# Patient Record
Sex: MALE | Race: WHITE
[De-identification: names, ages, dates, MRNs, and addresses within clinical notes are randomized per-mention and may not be internally consistent; named-entity substitution may affect disease eponyms.]

---

## 2019-12-01 ENCOUNTER — HOSPITAL ENCOUNTER (EMERGENCY)
Dept: HOSPITAL 62 - ER | Age: 54
Discharge: HOME | End: 2019-12-01
Payer: OTHER GOVERNMENT

## 2019-12-01 VITALS — SYSTOLIC BLOOD PRESSURE: 132 MMHG | DIASTOLIC BLOOD PRESSURE: 83 MMHG

## 2019-12-01 DIAGNOSIS — R53.1: ICD-10-CM

## 2019-12-01 DIAGNOSIS — R06.02: Primary | ICD-10-CM

## 2019-12-01 DIAGNOSIS — M54.9: ICD-10-CM

## 2019-12-01 DIAGNOSIS — R79.89: ICD-10-CM

## 2019-12-01 DIAGNOSIS — R07.89: ICD-10-CM

## 2019-12-01 DIAGNOSIS — Z82.49: ICD-10-CM

## 2019-12-01 DIAGNOSIS — Z87.891: ICD-10-CM

## 2019-12-01 DIAGNOSIS — R00.0: ICD-10-CM

## 2019-12-01 LAB
ADD MANUAL DIFF: NO
ALBUMIN SERPL-MCNC: 4.7 G/DL (ref 3.5–5)
ALP SERPL-CCNC: 70 U/L (ref 38–126)
ANION GAP SERPL CALC-SCNC: 11 MMOL/L (ref 5–19)
AST SERPL-CCNC: 28 U/L (ref 17–59)
BASOPHILS # BLD AUTO: 0 10^3/UL (ref 0–0.2)
BASOPHILS NFR BLD AUTO: 0.5 % (ref 0–2)
BILIRUB DIRECT SERPL-MCNC: 0.1 MG/DL (ref 0–0.4)
BILIRUB SERPL-MCNC: 0.6 MG/DL (ref 0.2–1.3)
BUN SERPL-MCNC: 19 MG/DL (ref 7–20)
CALCIUM: 10.4 MG/DL (ref 8.4–10.2)
CHLORIDE SERPL-SCNC: 103 MMOL/L (ref 98–107)
CO2 SERPL-SCNC: 28 MMOL/L (ref 22–30)
EOSINOPHIL # BLD AUTO: 0.1 10^3/UL (ref 0–0.6)
EOSINOPHIL NFR BLD AUTO: 0.8 % (ref 0–6)
ERYTHROCYTE [DISTWIDTH] IN BLOOD BY AUTOMATED COUNT: 13.7 % (ref 11.5–14)
GLUCOSE SERPL-MCNC: 115 MG/DL (ref 75–110)
HCT VFR BLD CALC: 46.7 % (ref 37.9–51)
HGB BLD-MCNC: 16.1 G/DL (ref 13.5–17)
LYMPHOCYTES # BLD AUTO: 1.7 10^3/UL (ref 0.5–4.7)
LYMPHOCYTES NFR BLD AUTO: 21.4 % (ref 13–45)
MCH RBC QN AUTO: 30.3 PG (ref 27–33.4)
MCHC RBC AUTO-ENTMCNC: 34.4 G/DL (ref 32–36)
MCV RBC AUTO: 88 FL (ref 80–97)
MONOCYTES # BLD AUTO: 0.4 10^3/UL (ref 0.1–1.4)
MONOCYTES NFR BLD AUTO: 5.3 % (ref 3–13)
NEUTROPHILS # BLD AUTO: 5.7 10^3/UL (ref 1.7–8.2)
NEUTS SEG NFR BLD AUTO: 72 % (ref 42–78)
PLATELET # BLD: 242 10^3/UL (ref 150–450)
POTASSIUM SERPL-SCNC: 4.5 MMOL/L (ref 3.6–5)
PROT SERPL-MCNC: 8 G/DL (ref 6.3–8.2)
RBC # BLD AUTO: 5.32 10^6/UL (ref 4.35–5.55)
TOTAL CELLS COUNTED % (AUTO): 100 %
WBC # BLD AUTO: 8 10^3/UL (ref 4–10.5)

## 2019-12-01 PROCEDURE — 71046 X-RAY EXAM CHEST 2 VIEWS: CPT

## 2019-12-01 PROCEDURE — 80053 COMPREHEN METABOLIC PANEL: CPT

## 2019-12-01 PROCEDURE — 99285 EMERGENCY DEPT VISIT HI MDM: CPT

## 2019-12-01 PROCEDURE — 36415 COLL VENOUS BLD VENIPUNCTURE: CPT

## 2019-12-01 PROCEDURE — 93010 ELECTROCARDIOGRAM REPORT: CPT

## 2019-12-01 PROCEDURE — 93005 ELECTROCARDIOGRAM TRACING: CPT

## 2019-12-01 PROCEDURE — 85025 COMPLETE CBC W/AUTO DIFF WBC: CPT

## 2019-12-01 PROCEDURE — 83735 ASSAY OF MAGNESIUM: CPT

## 2019-12-01 PROCEDURE — 84484 ASSAY OF TROPONIN QUANT: CPT

## 2019-12-01 NOTE — RADIOLOGY REPORT (SQ)
EXAM DESCRIPTION:  CHEST 2 VIEWS



COMPLETED DATE/TIME:  12/1/2019 2:10 pm



REASON FOR STUDY:  chest pain



COMPARISON:  None.



EXAM PARAMETERS:  NUMBER OF VIEWS: two views

TECHNIQUE: Digital Frontal and Lateral radiographic views of the chest acquired.

RADIATION DOSE: NA

LIMITATIONS: none



FINDINGS:  LUNGS AND PLEURA: No opacities, masses or pneumothorax. No pleural effusion.

MEDIASTINUM AND HILAR STRUCTURES: No masses or contour abnormalities.

HEART AND VASCULAR STRUCTURES: Heart normal size.  No evidence for failure.

BONES: No acute findings.

HARDWARE: None in the chest.

OTHER: No other significant finding.



IMPRESSION:  NO ACUTE RADIOGRAPHIC FINDING IN THE CHEST.



TECHNICAL DOCUMENTATION:  JOB ID:  [id number]

 2011 [employer]- All Rights Reserved



Reading location - IP/workstation name: [email or web address]

## 2019-12-01 NOTE — EKG REPORT
SEVERITY:- ABNORMAL ECG -

SINUS RHYTHM

RIGHT BUNDLE BRANCH BLOCK

:

Confirmed by: Lesley Orozco 01-Dec-2019 23:32:08

## 2019-12-01 NOTE — ER DOCUMENT REPORT
ED General





- General


Chief Complaint: Chest Pain


Stated Complaint: SHORTNESS OF BREATH/CHEST PAIN


Time Seen by Provider: 12/01/19 12:43


Mode of Arrival: Ambulatory


Notes: 





54-year-old male presents emergency department complaining of shortness of 

breath and chest tightness that feels like he cannot take a deep breath starting

over a month ago that worsened in the past 2 weeks.  Today he states that he was

feeling somewhat faint and he is got pain in his chest and his back and into his

left upper quadrant.  States that it is aching in nature and worsens with 

sitting up and decreases while laying flat on his back.  States that he did not 

feel well while working outside in the yard 2 days ago but it did not increase 

his pain or feelings of faintness.  Denies any pleuritic chest pain, fevers, 

chills, nausea or vomiting.  Has not tried any antacid type medications.  He 

states that when the pain started it felt like a really bad heartburn while 

driving approximately 1 month ago.





Patient has been to see his primary care provider AILEEN gary who works with 

Dr. Tai and was diagnosed with anemia 3 weeks ago and had a negative chest 

x-ray but the hemoglobin was repeated 2 weeks ago and found to be stable.  He 

did have a colonoscopy last week and it showed 2 polyps but nothing else.





Of note the patient has been on doxycycline for the past 1.5 weeks.  Patient 

states it was prescribed by urgent care for "general discomfort" patient states 

he was not given a specific diagnosis to tell him what it was and that no 

testing was done.  States he has had epididymitis in the past that felt similar.

 Patient currently has absolutely no symptoms.


TRAVEL OUTSIDE OF THE [place] IN LAST 30 DAYS: No





- Related Data


Allergies/Adverse Reactions: 


                                        





No Known Allergies Allergy (Verified 12/01/19 12:44)


   











Past Medical History





- General


Information source: Patient





- Social History


Smoking Status: Former Smoker - Quit in 2017.


Chew tobacco use (# tins/day): No


Frequency of alcohol use: Occasional - 2 times per month.


Drug Abuse: None


Family History: CAD - Maternal grandfather had an MI..  denies: CVA


Patient has suicidal ideation: No


Patient has homicidal ideation: No





- Medical History


Medical History: Other - anemia





- Past Medical History


Cardiac Medical History: Reports: Hx Hypercholesterolemia


Pulmonary Medical History: Reports: None


EENT Medical History: Reports: None


Neurological Medical History: Reports: None


Endocrine Medical History: Reports: None


Renal/ Medical History: Reports: None


Malignancy Medical History: Reports None


GI Medical History: Reports: None


Musculoskeletal Medical History: Reports Hx Gout


Skin Medical History: Reports None


Psychiatric Medical History: Reports: None


Traumatic Medical History: Reports: Hx Fractures - left finger


Past Surgical History: Reports: Hx Nose Surgery - septoplasty, Hx Orthopedic 

Surgery - left hand





Review of Systems





- Review of Systems


Constitutional: See HPI, Weakness


EENT: No symptoms reported


Cardiovascular: See HPI


Respiratory: See HPI


Gastrointestinal: No symptoms reported


Male Genitourinary: See HPI


-: Yes All other systems reviewed and negative





Physical Exam





- Vital signs


Vitals: 


                                        











Temp Pulse Resp BP Pulse Ox


 


 97.5 F   85   18   107/78   97 


 


 12/01/19 12:41  12/01/19 12:41  12/01/19 12:41  12/01/19 12:41  12/01/19 12:41











Interpretation: Normal





- Notes


Notes: 





GENERAL: Alert, interacts well.  No acute distress.


HEAD: Normocephalic, atraumatic


EYES: Pupils equal, round and reactive to light, extraocular movements intact.


ENT: Oral mucosa moist, tongue midline. 


NECK: Full range of motion, supple, trachea midline.


LUNGS: Clear to auscultation bilaterally, no wheezes, rales or rhonchi, no 

respiratory distress.


HEART: Regular rate and rhythm, no murmurs, gallops, rubs.  


ABDOMEN: Soft, nontender, nondistended, bowel sounds present in all 4 quadrants.

 


EXTREMITIES: Moves all 4 extremities spontaneously, no edema, radial and 

dorsalis pedis pulses 2/4 bilaterally.  No cyanosis.  


NEUROLOGICAL: Alert and oriented x3, normal speech, biceps and patellar DTRs 2+ 

bilaterally.


PSYCH: Normal mood, normal affect.


SKIN: Warm, Dry, normal turgor, no rashes or lesions noted.





Course





- Re-evaluation


Re-evalutation: 





12/01/19 17:58


CBC unremarkable, CMP shows elevated creatinine at 1.32, patient is already aw

are of this, calcium mildly elevated at 10.4, troponin negative x2, chest x-ray 

shows no acute process, when ambulated patient did not have any worsening 

symptoms, he became only mildly tachycardic consistent with exertion, did not 

become short of breath and did not become hypoxic.





Discussed with patient that as he initially stated that the first episode 

happened when he had a severe burning pain in his chest that felt like heartburn

I would like him to at least try taking a daily antacid such as Pepcid every day

for the next 2 weeks.  Patient states that he has heard himself wheezing in the 

past, would like to know if he can have an inhaler.


12/01/19 18:01


Patient will be given an inhaler to use when he hears himself wheezing, taught 

how to use it and a spacer was included.





Discussed with patient that for these feelings of increasing shortness of breath

he should follow-up with a pulmonologist but he should also follow-up with 

cardiologist to discuss the possibility of a stress test.





At present I have very low suspicion for pulmonary embolism.  Patient is a 

 but only drives short distances, he is not hypoxic, he is not tachypneic

and he is minimally tachycardic only on exertion.





- Vital Signs


Vital signs: 


                                        











Temp Pulse Resp BP Pulse Ox


 


 97.5 F   85   11 L  93/52 L  99 


 


 12/01/19 12:41  12/01/19 12:41  12/01/19 15:01  12/01/19 15:01  12/01/19 15:01














- Laboratory


Result Diagrams: 


                                 12/01/19 13:04





                                 12/01/19 13:04


Laboratory results interpreted by me: 


                                        











  12/01/19





  13:04


 


Creatinine  1.32 H


 


Est GFR (MDRD) Non-Af  57 L


 


Glucose  115 H


 


Calcium  10.4 H














- EKG Interpretation by Me


Additional EKG results interpreted by me: 





12/01/19 18:01


EKG shows sinus rhythm at a rate of 74, slight right axis deviation, right 

bundle branch block, no STEMI, T wave inversions in V2, V3 per my 

interpretation.





Discharge





- Discharge


Clinical Impression: 


 Increasing shortness of breath





Condition: Stable


Disposition: HOME, SELF-CARE


Additional Instructions: 


Chest Pain of Unclear Cause





   The exact cause of your chest pain isn't clear. Fortunately, there is no 

evidence of a dangerous medical condition.  Further testing may be required to 

find the source of the pain.


   Most often, we find that this pain is coming from the chest wall -- the 

muscles or rib joints in the chest.  But chest pain can come from the lung and 

lung lining, the esophagus, the heart valves or heart lining, and even the 

stomach or gallbladder.


   Rest.  Eat lightly until the pain is gone.  We may prescribe medicine for 

pain and inflammation.


   You should call the physician immediately if the pain radiates to the 

shoulder, jaw or arms; if you start to run a fever or develop a cough; or if you

develop shortness of breath, or other new or alarming symptoms.





Dyspnea, Nonspecific





   You were evaluated for shortness of breath, or dyspnea. Dyspnea has many 

causes, and some are more serious than others. Sometimes it's impossible to 

diagnose the cause of dyspnea with the tests that are available on an emergency 

basis. Based on our evaluation today, you do not need hospitalization now. We 

found no evidence of pneumonia, collapsed lung, blood clots in the lung, tumors,

or heart failure.


     Causes of non-specific dyspnea can include asthma or bronchospasm, 

hyperventilation, emotional distress, heart disease, emphysema, fibrosis of the 

lung, and stiffness of the chest wall.


     In healthy individuals with a single episode, it's sometimes reasonable to 

do nothing but wait to see if the problem occurs again. Additional tests used to

evaluate dyspnea can include cardiac stress testing, echocardiography, pulmonary

function testing, CAT scan of the chest, bronchoscopy or pulmonary biopsy.


     Return if shortness of breath persists or worsens, or if you develop chest 

pain, fever, cough, confusion, or fainting.





Please take Pepcid 20 mg twice a day for the next 2 weeks to see if this may be 

coming from reflux or heartburn.





I have prescribed an inhaler that you may use 2 puffs every 4 hours as needed 

for wheezing.  If you need it more often than every 2 hours please return to the

emergency department.

## 2019-12-01 NOTE — ER DOCUMENT REPORT
ED Medical Screen (RME)





- General


Chief Complaint: Chest Pain


Stated Complaint: SHORTNESS OF BREATH/CHEST PAIN


Time Seen by Provider: 12/01/19 12:43


Mode of Arrival: Ambulatory


Information source: Patient


Notes: 





54-year-old male presented to ED for complaint of chest pain shortness of breath

and dizziness for about a month but today is more severe than normal.  Pain 

level is 2/5 to the entire chest cavity and the back.  He denies any cough, he 

has had congestion and hacking up phlegm but none today.  Denies any fevers.











I have greeted and performed a rapid initial assessment of this patient.  A 

comprehensive ED assessment and evaluation of the patient, analysis of test 

results and completion of medical decision making process will be conducted by 

an additional ED providers.





- Related Data


Smoking: Other - former


Frequency of alcohol use: Occasional


Drug Abuse: None


Allergies/Adverse Reactions: 


                                        





No Known Allergies Allergy (Verified 12/01/19 12:44)


   











Past Medical History





- Medical History


Medical History: Other - anemia





- Past Medical History


Cardiac Medical History: Reports: Hx Hypercholesterolemia


Pulmonary Medical History: Reports: None


EENT Medical History: Reports: None


Neurological Medical History: Reports: None


Endocrine Medical History: Reports: None


Renal/ Medical History: Reports: None


Malignancy Medical History: Reports None


GI Medical History: Reports: None


Musculoskeltal Medical History: Reports Hx Gout


Skin Medical History: Reports None


Psychiatric Medical History: Reports: None


Traumatic Medical History: Reports: Hx Fractures - left finger


Past Surgical History: Reports: Hx Nose Surgery - septoplasty, Hx Orthopedic 

Surgery - left hand





Physical Exam





- Vital signs


Vitals: 


                                        











Temp Pulse Resp BP Pulse Ox


 


 97.5 F   85   18   107/78   97 


 


 12/01/19 12:41  12/01/19 12:41  12/01/19 12:41  12/01/19 12:41  12/01/19 12:41














Course





- Vital Signs


Vital signs: 


                                        











Temp Pulse Resp BP Pulse Ox


 


 97.5 F   85   18   107/78   97 


 


 12/01/19 12:41  12/01/19 12:41  12/01/19 12:41  12/01/19 12:41  12/01/19 12:41

## 2020-12-15 ENCOUNTER — HOSPITAL ENCOUNTER (OUTPATIENT)
Dept: HOSPITAL 62 - RAD | Age: 55
End: 2020-12-15
Attending: PHYSICIAN ASSISTANT
Payer: COMMERCIAL

## 2020-12-15 DIAGNOSIS — R05: Primary | ICD-10-CM

## 2020-12-15 PROCEDURE — 71046 X-RAY EXAM CHEST 2 VIEWS: CPT

## 2020-12-15 NOTE — RADIOLOGY REPORT (SQ)
EXAM DESCRIPTION:  CHEST 2 VIEWS



IMAGES COMPLETED DATE/TIME:  12/15/2020 9:41 am



REASON FOR STUDY:  (R05)COUGH



COMPARISON:  12/1/2019



EXAM PARAMETERS:  NUMBER OF VIEWS: two views

TECHNIQUE: Digital Frontal and Lateral radiographic views of the chest acquired.

RADIATION DOSE: NA

LIMITATIONS: none



FINDINGS:  LUNGS AND PLEURA: No opacities, masses or pneumothorax. No pleural effusion.

MEDIASTINUM AND HILAR STRUCTURES: No masses or contour abnormalities.

HEART AND VASCULAR STRUCTURES: Heart normal size.  No evidence for failure.

BONES: No acute findings.

HARDWARE: None in the chest.

OTHER: No other significant finding.



IMPRESSION:  NO ACUTE RADIOGRAPHIC FINDING IN THE CHEST.



TECHNICAL DOCUMENTATION:  JOB ID:  [id number]

 2011 [employer]- All Rights Reserved



Reading location - IP/workstation name: [email or web address]